# Patient Record
Sex: FEMALE | Race: WHITE | Employment: PART TIME | URBAN - METROPOLITAN AREA
[De-identification: names, ages, dates, MRNs, and addresses within clinical notes are randomized per-mention and may not be internally consistent; named-entity substitution may affect disease eponyms.]

---

## 2023-01-10 ENCOUNTER — ANCILLARY PROCEDURE (OUTPATIENT)
Dept: OBGYN CLINIC | Age: 48
End: 2023-01-10
Payer: MEDICAID

## 2023-01-10 ENCOUNTER — INITIAL PRENATAL (OUTPATIENT)
Dept: OBGYN CLINIC | Age: 48
End: 2023-01-10
Payer: MEDICAID

## 2023-01-10 VITALS
BODY MASS INDEX: 41.25 KG/M2 | SYSTOLIC BLOOD PRESSURE: 112 MMHG | HEART RATE: 88 BPM | DIASTOLIC BLOOD PRESSURE: 75 MMHG | WEIGHT: 204.25 LBS

## 2023-01-10 DIAGNOSIS — Z34.90 FOURTH PREGNANCY: ICD-10-CM

## 2023-01-10 DIAGNOSIS — Z3A.22 22 WEEKS GESTATION OF PREGNANCY: Primary | ICD-10-CM

## 2023-01-10 LAB
GLUCOSE URINE, POC: NEGATIVE
PROTEIN UA: NEGATIVE

## 2023-01-10 PROCEDURE — 99203 OFFICE O/P NEW LOW 30 MIN: CPT | Performed by: OBSTETRICS & GYNECOLOGY

## 2023-01-10 PROCEDURE — 99999 PR OFFICE/OUTPT VISIT,PROCEDURE ONLY: CPT | Performed by: OBSTETRICS & GYNECOLOGY

## 2023-01-10 PROCEDURE — 76811 OB US DETAILED SNGL FETUS: CPT | Performed by: OBSTETRICS & GYNECOLOGY

## 2023-01-10 PROCEDURE — 81002 URINALYSIS NONAUTO W/O SCOPE: CPT | Performed by: OBSTETRICS & GYNECOLOGY

## 2023-01-10 NOTE — PROGRESS NOTES
Pt here for new patient ultrasound  Pt denies any bleeding/cramping  Pt states feeling fetal movement

## 2023-01-10 NOTE — PROGRESS NOTES
[address]  FETAL MEDICINE  [address] [facility] [address] [phone]   [facility] [address] FETAL MEDICINE  [address]  [facility] [address]  Dept: [address] [facility]: [phone]     1/10/2023    RE:  Mariia Forde     : 1975   AGE: 41 y.o. Dear Dr. Jacqueline De La Rosa,    Thank you for allowing me to see Mariia Forde. As I'm sure you will recall, Mariia Forde is a 41 y.o. [id number]'s last menstrual period was 2022.  Estimated Date of Delivery: 5/15/23 at 22w1d seen in our office today for the following:    REASON FOR VISIT: Level II    Patient Active Problem List    Diagnosis Date Noted    22 weeks gestation of pregnancy 01/10/2023    Fourth pregnancy 01/10/2023    BMI 40.0-44.9, adult ([facility] [address] 06/10/2016    Depression 2015    Slow transit constipation 2015    Hashimoto's disease 2012    Vitamin D deficiency 2012    Hypothyroid 2012        PAST HISTORY:  OB History    Para Term  AB Living   4 3 3     3   SAB IAB Ectopic Molar Multiple Live Births             3      # Outcome Date GA Lbr Yusuf/2nd Weight Sex Delivery Anes PTL Lv   4 Current            3 Term 10/02/06 38w0d  7 lb 13 oz (3.544 kg) M Vag-Spont EPI N KWADWO      Birth Comments: cord around neck x2   high risk pregnancy due to thyroid issues and saw Dr. Kathy Figueroa   2 Term 04 40w0d  7 lb 8 oz (3.402 kg) F Vag-Spont EPI  KWADWO   1 Term 99 40w0d  7 lb 5 oz (3.317 kg) M Vag-Spont Local N KWADWO          MEDICAL:  Past Medical History:   Diagnosis Date    Depression     Hypothyroidism complicating pregnancy     Weight gain         SURGICAL:  Past Surgical History:   Procedure Laterality Date    WISDOM TOOTH EXTRACTION         ALLERGIES:    No Known Allergies      MEDICATIONS:    Current Outpatient Medications   Medication Sig Dispense Refill    Prenatal Vit-Fe Fumarate-FA (PRENATAL VITAMIN PO) Take 1 each by mouth daily       No current facility-administered medications for this visit. Social History     Socioeconomic History    Marital status:      Spouse name: None    Number of children: None    Years of education: None    Highest education level: None   Tobacco Use    Smoking status: Former     Types: Cigarettes    Smokeless tobacco: Never   Substance and Sexual Activity    Alcohol use: Not Currently     Comment: one glass wine daily     Drug use: No    Sexual activity: Not Currently          FAMILY MEDICAL HISTORY:   Family History   Problem Relation Age of Onset    Thyroid Disease Mother         and both grandmothers    Elevated Lipids Father     Diabetes Maternal Grandmother     Thyroid Disease Maternal Grandmother     Diabetes Maternal Grandfather     Thyroid Disease Paternal Grandmother           PHYSICAL EXAMINATION:  /75   Pulse 88   Wt 204 lb 4 oz (92.6 kg)   LMP 08/08/2022   Body mass index is 41.25 kg/m². Urine dipstick:  Results for POC orders placed in visit on 01/10/23   POCT urine qual dipstick protein   Result Value Ref Range    Protein, UA Negative Negative   POCT urine qual dipstick glucose   Result Value Ref Range    Glucose, UA POC negative         A/an Level II ultrasound was done in our office today. Please refer to the enclosed copy of the ultrasound report for further information. Discussion:  There is a ibarra fetus in a breech presentation. Fetal cardiac motion and fetal motion was detected in. Be grossly normal.  No obvious anomalies are noted cardiac views were somewhat limited due to the fetal position and maternal habitus. There is been appropriate interval growth. The baby is AGA. The placenta is anterior left lateral.  Amniotic fluid volume is normal.  Cervix appeared to be long and closed. IMPRESSION:  1.  Single intrauterine gestation at 22+ weeks with appropriate interval growth. 2. The fetal anatomy appears normal and the fetal lie is breech. 3. The amniotic fluid volume is normal and the placenta is anterior left lateral.    RECOMMENDATIONS:  Each of the recommendations were discussed with the patient:  1. Return in 4 weeks for completion of anatomy. 2.  Begin antepartum testing at 34 weeks gestation because of BMI and AMA. 3.  Consider growth ultrasounds every 4 weeks. 4.  Delivery at 39 weeks gestation. 5.  Follow-up would be otherwise as clinically indicated. The patient is to continue to follow with you in your office for ongoing obstetric care. PLAN:    As noted above or sooner prn.     Sincerely,        Lesley Torres MD

## 2023-01-10 NOTE — PATIENT INSTRUCTIONS
Please arrive for your scheduled appointment at least 15 minutes early with your actual insurance card+ a photo ID. Also if you need any refills ordered or have questions, it may take up 48 hours to reply. Please allow ample time for your refills. Call me when you use last refill. Thank you for your cooperation. Call your primary obstetrician with bleeding, leaking of fluid, abdominal tenderness, headache, blurry vision, epigastric pain and increased urinary frequency. If you are experiencing an emergency and need immediate help, call 911 or go to go emergency room or labor and delivery. if you are sick, not feeling well or have an infectious process going on please reschedule your appointment by calling [phone]. Also if any family members are not feeling well, please do not bring them to your appointment. We appreciate your cooperation. We are doing this in order to protect our pregnant mothers+ their babies. if you are sick, not feeling well or have an infectious process going on please reschedule your appointment by calling [phone]. Also if any family members are not feeling well, please do not bring them to your appointment. We appreciate your cooperation. We are doing this in order to protect our pregnant mothers+ their babies.

## 2023-02-07 ENCOUNTER — ROUTINE PRENATAL (OUTPATIENT)
Dept: OBGYN CLINIC | Age: 48
End: 2023-02-07
Payer: MEDICAID

## 2023-02-07 ENCOUNTER — ANCILLARY PROCEDURE (OUTPATIENT)
Dept: OBGYN CLINIC | Age: 48
End: 2023-02-07
Payer: MEDICAID

## 2023-02-07 VITALS
HEART RATE: 78 BPM | SYSTOLIC BLOOD PRESSURE: 96 MMHG | DIASTOLIC BLOOD PRESSURE: 58 MMHG | WEIGHT: 205.5 LBS | BODY MASS INDEX: 41.51 KG/M2

## 2023-02-07 DIAGNOSIS — Z3A.26 26 WEEKS GESTATION OF PREGNANCY: Primary | ICD-10-CM

## 2023-02-07 LAB
GLUCOSE URINE, POC: NEGATIVE
PROTEIN UA: NEGATIVE

## 2023-02-07 PROCEDURE — 76816 OB US FOLLOW-UP PER FETUS: CPT | Performed by: OBSTETRICS & GYNECOLOGY

## 2023-02-07 PROCEDURE — 81002 URINALYSIS NONAUTO W/O SCOPE: CPT | Performed by: OBSTETRICS & GYNECOLOGY

## 2023-02-07 PROCEDURE — 99999 PR OFFICE/OUTPT VISIT,PROCEDURE ONLY: CPT | Performed by: OBSTETRICS & GYNECOLOGY

## 2023-02-07 PROCEDURE — 99213 OFFICE O/P EST LOW 20 MIN: CPT | Performed by: OBSTETRICS & GYNECOLOGY

## 2023-02-07 NOTE — PATIENT INSTRUCTIONS
Please arrive for your scheduled appointment at least 15 minutes early with your actual insurance card+ a photo ID. Also if you need any refills ordered or have questions, it may take up 48 hours to reply. Please allow ample time for your refills. Call me when you use last refill. Thank you for your cooperation. Call your primary obstetrician with bleeding, leaking of fluid, abdominal tenderness, headache, blurry vision, epigastric pain and increased urinary frequency. If you are experiencing an emergency and need immediate help, call 911 or go to go emergency room or labor and delivery. if you are sick, not feeling well or have an infectious process going on please reschedule your appointment by calling [phone]. Also if any family members are not feeling well, please do not bring them to your appointment. We appreciate your cooperation. We are doing this in order to protect our pregnant mothers+ their babies. if you are sick, not feeling well or have an infectious process going on please reschedule your appointment by calling [phone]. Also if any family members are not feeling well, please do not bring them to your appointment. We appreciate your cooperation. We are doing this in order to protect our pregnant mothers+ their babies.

## 2023-02-07 NOTE — PROGRESS NOTES
[address] FETAL MEDICINE  [address] \[facility]\"" [address] [phone]   [address] FETAL MEDICINE  [address]  [facility] [address]  Dept: [phone]  Loc: [phone]     2023    RE:  Kaela Orantes     : 1975   AGE: 41 y.o. Dear Dr. Danelle Sher,    Thank you for allowing me to see Kaela Orantes. As I'm sure you will recall, Kaela Orantes is a 41 y.o. [id number]'s last menstrual period was 2022.  Estimated Date of Delivery: 5/15/23 at 26w1d seen in our office today for the following:    REASON FOR VISIT: Growth     Patient Active Problem List    Diagnosis Date Noted    26 weeks gestation of pregnancy 01/10/2023    Fourth pregnancy 01/10/2023    BMI 40.0-44.9, adult ([facility] [address] 06/10/2016    Depression 2015    Slow transit constipation 2015    Hashimoto's disease 2012    Vitamin D deficiency 2012    Hypothyroid 2012        PAST HISTORY:  OB History    Para Term  AB Living   4 3 3     3   SAB IAB Ectopic Molar Multiple Live Births             3      # Outcome Date GA Lbr Yusuf/2nd Weight Sex Delivery Anes PTL Lv   4 Current            3 Term 10/02/06 38w0d  7 lb 13 oz (3.544 kg) M Vag-Spont EPI N KWADWO      Birth Comments: cord around neck x2   high risk pregnancy due to thyroid issues and saw Dr. Rojas Pulse   2 Term 04 40w0d  7 lb 8 oz (3.402 kg) F Vag-Spont EPI  KWADWO   1 Term 99 40w0d  7 lb 5 oz (3.317 kg) M Vag-Spont Local N KWADWO          MEDICAL:  Past Medical History:   Diagnosis Date    Depression     Hypothyroidism complicating pregnancy     Weight gain         SURGICAL:  Past Surgical History:   Procedure Laterality Date    WISDOM TOOTH EXTRACTION         ALLERGIES:    No Known Allergies      MEDICATIONS:    Current Outpatient Medications   Medication Sig Dispense Refill    Prenatal Vit-Fe Fumarate-FA (PRENATAL VITAMIN PO) Take 1 each by mouth daily       No current facility-administered medications for this visit. Social History     Socioeconomic History    Marital status:      Spouse name: None    Number of children: None    Years of education: None    Highest education level: None   Tobacco Use    Smoking status: Former     Types: Cigarettes    Smokeless tobacco: Never   Substance and Sexual Activity    Alcohol use: Not Currently     Comment: one glass wine daily     Drug use: No    Sexual activity: Not Currently          FAMILY MEDICAL HISTORY:   Family History   Problem Relation Age of Onset    Thyroid Disease Mother         and both grandmothers    Elevated Lipids Father     Diabetes Maternal Grandmother     Thyroid Disease Maternal Grandmother     Diabetes Maternal Grandfather     Thyroid Disease Paternal Grandmother           PHYSICAL EXAMINATION:  BP (!) 96/58   Pulse 78   Wt 205 lb 8 oz (93.2 kg)   LMP 08/08/2022   Body mass index is 41.51 kg/m². Urine dipstick:  Results for POC orders placed in visit on 02/07/23   POCT urine qual dipstick protein   Result Value Ref Range    Protein, UA Negative Negative   POCT urine qual dipstick glucose   Result Value Ref Range    Glucose, UA POC negative         A/an Growth  ultrasound was done in our office today. Please refer to the enclosed copy of the ultrasound report for further information. Discussion:  There is a biarra fetus in a wei breech presentation. Fetal cardiac motion and fetal motion is detected and appears to be grossly normal.  Due to fetal position we were unable to complete the anatomy survey. Unable to obtain cardiac views well but have no concerns about the fetal heart. There is been appropriate interval growth. The baby is AGA at the 44th percentile 844 g 1 pound 14 ounces. The placenta is left lateral.  Amniotic fluid volume is normal.    IMPRESSION:  1. Single intrauterine gestation at 26+ weeks with appropriate interval growth. 2.  No obvious fetal anomalies are noted. The fetus is in a wei breech presentation. 3.  The amniotic fluid volume is normal and the placenta is left lateral.    RECOMMENDATIONS:  Each of the recommendations were discussed with the patient:  1. Return in 4 weeks to attempt to complete anatomy survey as well as growth. 2.  Begin antepartum testing at 34 weeks gestation because of the increased BMI. 3.  Delivery at 39 weeks gestation. 4.  Follow-up would be otherwise as clinically indicated. The patient is to continue to follow with you in your office for ongoing obstetric care. PLAN:    As noted above or sooner prn.     Sincerely,        Candy Bradshaw MD

## 2023-03-07 ENCOUNTER — ANCILLARY PROCEDURE (OUTPATIENT)
Dept: OBGYN CLINIC | Age: 48
End: 2023-03-07
Payer: MEDICAID

## 2023-03-07 ENCOUNTER — ROUTINE PRENATAL (OUTPATIENT)
Dept: OBGYN CLINIC | Age: 48
End: 2023-03-07
Payer: MEDICAID

## 2023-03-07 VITALS
DIASTOLIC BLOOD PRESSURE: 85 MMHG | HEART RATE: 86 BPM | BODY MASS INDEX: 42.26 KG/M2 | SYSTOLIC BLOOD PRESSURE: 129 MMHG | WEIGHT: 209.25 LBS

## 2023-03-07 DIAGNOSIS — Z3A.30 30 WEEKS GESTATION OF PREGNANCY: Primary | ICD-10-CM

## 2023-03-07 LAB
GLUCOSE URINE, POC: NEGATIVE
PROTEIN UA: NEGATIVE

## 2023-03-07 PROCEDURE — 99999 PR OFFICE/OUTPT VISIT,PROCEDURE ONLY: CPT | Performed by: OBSTETRICS & GYNECOLOGY

## 2023-03-07 PROCEDURE — 76805 OB US >/= 14 WKS SNGL FETUS: CPT | Performed by: OBSTETRICS & GYNECOLOGY

## 2023-03-07 PROCEDURE — 99213 OFFICE O/P EST LOW 20 MIN: CPT | Performed by: OBSTETRICS & GYNECOLOGY

## 2023-03-07 PROCEDURE — 81002 URINALYSIS NONAUTO W/O SCOPE: CPT | Performed by: OBSTETRICS & GYNECOLOGY

## 2023-03-07 NOTE — PROGRESS NOTES
Pt here for follow up ultrasound  Pt had 3 hour GTT yesterday and results scanned into chart  Pt denies any bleeding/cramping  Pt states good fetal movement

## 2023-03-07 NOTE — PROGRESS NOTES
[address] FETAL MEDICINE  [address]  [facility] [address]  Dept: [address] [facility] [address]: [phone]     3/7/2023    RE:  Vicente Sifuentes     : 1975   AGE: 41 y.o. Dear Dr. Sophie Savage,    Thank you for allowing me to see Vicente Sifuentes. As I'm sure you will recall, Vicente Sifuentes is a 41 y.o. [id number]'s last menstrual period was 2022.  Estimated Date of Delivery: 5/15/23 at 30w1d seen in our office today for the following:    REASON FOR VISIT: Growth     Patient Active Problem List    Diagnosis Date Noted    30 weeks gestation of pregnancy 01/10/2023    Fourth pregnancy 01/10/2023    BMI 40.0-44.9, adult ([facility] [address] 06/10/2016    Depression 2015    Slow transit constipation 2015    Hashimoto's disease 2012    Vitamin D deficiency 2012    Hypothyroid 2012        PAST HISTORY:  OB History    Para Term  AB Living   4 3 3     3   SAB IAB Ectopic Molar Multiple Live Births             3      # Outcome Date GA Lbr Yusuf/2nd Weight Sex Delivery Anes PTL Lv   4 Current            3 Term 10/02/06 38w0d  7 lb 13 oz (3.544 kg) M Vag-Spont EPI N KWADWO      Birth Comments: cord around neck x2   high risk pregnancy due to thyroid issues and saw Dr. Kyara Mcelroy   2 Term 04 40w0d  7 lb 8 oz (3.402 kg) F Vag-Spont EPI  KWADWO   1 Term 99 40w0d  7 lb 5 oz (3.317 kg) M Vag-Spont Local N KWADWO          MEDICAL:  Past Medical History:   Diagnosis Date    Depression     Hypothyroidism complicating pregnancy     Weight gain         SURGICAL:  Past Surgical History:   Procedure Laterality Date    WISDOM TOOTH EXTRACTION         ALLERGIES:    No Known Allergies      MEDICATIONS:    Current Outpatient Medications   Medication Sig Dispense Refill    Prenatal Vit-Fe Fumarate-FA (PRENATAL VITAMIN PO) Take 1 each by mouth daily       No current facility-administered medications for this visit. Social History     Socioeconomic History    Marital status:      Spouse name: None    Number of children: None    Years of education: None    Highest education level: None   Tobacco Use    Smoking status: Former     Types: Cigarettes    Smokeless tobacco: Never   Substance and Sexual Activity    Alcohol use: Not Currently     Comment: one glass wine daily     Drug use: No    Sexual activity: Not Currently          FAMILY MEDICAL HISTORY:   Family History   Problem Relation Age of Onset    Thyroid Disease Mother         and both grandmothers    Elevated Lipids Father     Diabetes Maternal Grandmother     Thyroid Disease Maternal Grandmother     Diabetes Maternal Grandfather     Thyroid Disease Paternal Grandmother           PHYSICAL EXAMINATION:  /85   Pulse 86   Wt 209 lb 4 oz (94.9 kg)   LMP 08/08/2022   Body mass index is 42.26 kg/m². Urine dipstick:  Results for POC orders placed in visit on 03/07/23   POCT urine qual dipstick protein   Result Value Ref Range    Protein, UA Negative Negative   POCT urine qual dipstick glucose   Result Value Ref Range    Glucose, UA POC negative         A/an Growth  ultrasound was done in our office today. Please refer to the enclosed copy of the ultrasound report for further information. Discussion:  There is a ibarra fetus in a vertex presentation. Fetal cardiac motion and fetal motion was detected and appears to be grossly normal.  There is been appropriate interval growth. The baby is AGA at the 42nd percentile. Estimated fetal weight 1420 g 3 pounds 2 ounces. The placenta is anterior. Amniotic fluid volume is normal.    IMPRESSION:  1. Single intrauterine gestation at 30+ weeks with appropriate interval growth. 2.  No obvious fetal anomalies are noted. The fetus is in a vertex presentation. 3.  The amniotic fluid volume is normal and the placenta is anterior.     RECOMMENDATIONS:  Each of the recommendations were discussed with the patient:  1. Continue growth ultrasounds every 4 weeks. 2.  Begin weekly biophysical profiles at 34 weeks because of the increased BMI. 3.  Delivery at 39 weeks gestation. 4.  Follow-up would be otherwise as clinically indicated. The patient is to continue to follow with you in your office for ongoing obstetric care. PLAN:    As noted above or sooner prn.     Sincerely,        Jonatan Castillo MD

## 2023-03-07 NOTE — PATIENT INSTRUCTIONS
Please arrive for your scheduled appointment at least 15 minutes early with your actual insurance card+ a photo ID. Also if you need any refills ordered or have questions, it may take up 48 hours to reply. Please allow ample time for your refills. Call me when you use last refill. Thank you for your cooperation. Do kick counts after dinner. Call your primary obstetrician if less than 10 kicks in 2 hours after dinner. Call your primary obstetrician with bleeding, leaking of fluid, abdominal tenderness, headache, blurry vision, epigastric pain and increased urinary frequency. if you are sick, not feeling well or have an infectious process going on please reschedule your appointment by calling [phone]. Also if any family members are not feeling well, please do not bring them to your appointment. We appreciate your cooperation. We are doing this in order to protect our pregnant mothers+ their babies.

## 2023-04-04 ENCOUNTER — ANCILLARY PROCEDURE (OUTPATIENT)
Dept: OBGYN CLINIC | Age: 48
End: 2023-04-04
Payer: MEDICAID

## 2023-04-04 ENCOUNTER — ROUTINE PRENATAL (OUTPATIENT)
Dept: OBGYN CLINIC | Age: 48
End: 2023-04-04
Payer: MEDICAID

## 2023-04-04 VITALS
HEART RATE: 87 BPM | SYSTOLIC BLOOD PRESSURE: 99 MMHG | BODY MASS INDEX: 42.64 KG/M2 | WEIGHT: 211.13 LBS | DIASTOLIC BLOOD PRESSURE: 68 MMHG

## 2023-04-04 DIAGNOSIS — Z3A.34 34 WEEKS GESTATION OF PREGNANCY: Primary | ICD-10-CM

## 2023-04-04 DIAGNOSIS — O09.523 MULTIGRAVIDA OF ADVANCED MATERNAL AGE IN THIRD TRIMESTER: ICD-10-CM

## 2023-04-04 LAB
GLUCOSE URINE, POC: NEGATIVE
PROTEIN UA: NEGATIVE

## 2023-04-04 PROCEDURE — 76819 FETAL BIOPHYS PROFIL W/O NST: CPT | Performed by: OBSTETRICS & GYNECOLOGY

## 2023-04-04 PROCEDURE — 81002 URINALYSIS NONAUTO W/O SCOPE: CPT | Performed by: OBSTETRICS & GYNECOLOGY

## 2023-04-04 PROCEDURE — H1000 PRENATAL CARE ATRISK ASSESSM: HCPCS | Performed by: OBSTETRICS & GYNECOLOGY

## 2023-04-04 PROCEDURE — 99213 OFFICE O/P EST LOW 20 MIN: CPT | Performed by: OBSTETRICS & GYNECOLOGY

## 2023-04-04 PROCEDURE — 76816 OB US FOLLOW-UP PER FETUS: CPT | Performed by: OBSTETRICS & GYNECOLOGY

## 2023-04-04 PROCEDURE — 99999 PR OFFICE/OUTPT VISIT,PROCEDURE ONLY: CPT | Performed by: OBSTETRICS & GYNECOLOGY

## 2023-04-04 NOTE — PATIENT INSTRUCTIONS
Please arrive for your scheduled appointment at least 15 minutes early with your actual insurance card+ a photo ID. Also if you need any refills ordered or have questions, it may take up 48 hours to reply. Please allow ample time for your refills. Call me when you use last refill. Thank you for your cooperation. Call your primary obstetrician with bleeding, leaking of fluid, abdominal tenderness, headache, blurry vision, epigastric pain and increased urinary frequency. If you are experiencing an emergency and need immediate help, call 911 or go to go emergency room or labor and delivery. Do kick counts after dinner. Call your primary obstetrician if less than 10 kicks in 2 hours after dinner. Call your primary obstetrician with bleeding, leaking of fluid, abdominal tenderness, headache, blurry vision, epigastric pain and increased urinary frequency. if you are sick, not feeling well or have an infectious process going on please reschedule your appointment by calling [phone]. Also if any family members are not feeling well, please do not bring them to your appointment. We appreciate your cooperation. We are doing this in order to protect our pregnant mothers+ their babies.

## 2023-04-04 NOTE — PROGRESS NOTES
[address] FETAL MEDICINE  [address]  [facility] [address]  Dept: [address] [facility] [address]: [phone]     2023    RE:  Stacie Setting     : 1975   AGE: 41 y.o. Dear Dr. Keith Nielson,    Thank you for allowing me to see Stacie Setting. As I'm sure you will recall, Stacie Setting is a 41 y.o. [id number]'s last menstrual period was 2022.  Estimated Date of Delivery: 5/15/23 at 34w1d seen in our office today for the following:    REASON FOR VISIT: Growth     Patient Active Problem List    Diagnosis Date Noted    34 weeks gestation of pregnancy 01/10/2023    Fourth pregnancy 01/10/2023    Multigravida of advanced maternal age in third trimester 2023    BMI 40.0-44.9, adult ([address] 06/10/2016    Depression 2015    Slow transit constipation 2015    Hashimoto's disease 2012    Vitamin D deficiency 2012    Hypothyroid 2012        PAST HISTORY:  OB History    Para Term  AB Living   4 3 3     3   SAB IAB Ectopic Molar Multiple Live Births             3      # Outcome Date GA Lbr Yusuf/2nd Weight Sex Delivery Anes PTL Lv   4 Current            3 Term 10/02/06 38w0d  7 lb 13 oz (3.544 kg) M Vag-Spont EPI N KWADWO      Birth Comments: cord around neck x2   high risk pregnancy due to thyroid issues and saw Dr. Praveen Huddleston   2 Term 04 40w0d  7 lb 8 oz (3.402 kg) F Vag-Spont EPI  KWADWO   1 Term 99 40w0d  7 lb 5 oz (3.317 kg) M Vag-Spont Local N KWADWO          MEDICAL:  Past Medical History:   Diagnosis Date    Depression     Hypothyroidism complicating pregnancy     Weight gain         SURGICAL:  Past Surgical History:   Procedure Laterality Date    WISDOM TOOTH EXTRACTION         ALLERGIES:    No Known Allergies      MEDICATIONS:    Current Outpatient Medications   Medication Sig Dispense Refill    Prenatal Vit-Fe Fumarate-FA (PRENATAL VITAMIN PO) Take 1 each by

## 2023-04-04 NOTE — PROGRESS NOTES
Pt here for 4 week follow up visit  Pt denies any bleeding/cramping/lof  Pt states good fetal movement

## 2023-04-14 ENCOUNTER — ANCILLARY PROCEDURE (OUTPATIENT)
Dept: OBGYN CLINIC | Age: 48
End: 2023-04-14
Payer: MEDICAID

## 2023-04-14 PROBLEM — Z3A.35 35 WEEKS GESTATION OF PREGNANCY: Status: ACTIVE | Noted: 2023-01-10

## 2023-04-14 PROCEDURE — 76818 FETAL BIOPHYS PROFILE W/NST: CPT | Performed by: OBSTETRICS & GYNECOLOGY

## 2023-04-21 ENCOUNTER — ROUTINE PRENATAL (OUTPATIENT)
Dept: OBGYN CLINIC | Age: 48
End: 2023-04-21
Payer: MEDICAID

## 2023-04-21 ENCOUNTER — ANCILLARY PROCEDURE (OUTPATIENT)
Dept: OBGYN CLINIC | Age: 48
End: 2023-04-21
Payer: MEDICAID

## 2023-04-21 VITALS
SYSTOLIC BLOOD PRESSURE: 108 MMHG | WEIGHT: 214 LBS | DIASTOLIC BLOOD PRESSURE: 70 MMHG | HEART RATE: 84 BPM | BODY MASS INDEX: 43.22 KG/M2

## 2023-04-21 DIAGNOSIS — O09.523 MULTIGRAVIDA OF ADVANCED MATERNAL AGE IN THIRD TRIMESTER: Primary | ICD-10-CM

## 2023-04-21 DIAGNOSIS — Z34.90 FOURTH PREGNANCY: ICD-10-CM

## 2023-04-21 DIAGNOSIS — E06.3 HASHIMOTO'S DISEASE: ICD-10-CM

## 2023-04-21 DIAGNOSIS — Z3A.36 36 WEEKS GESTATION OF PREGNANCY: ICD-10-CM

## 2023-04-21 LAB
GLUCOSE URINE, POC: NORMAL
PROTEIN UA: NEGATIVE

## 2023-04-21 PROCEDURE — 99213 OFFICE O/P EST LOW 20 MIN: CPT | Performed by: OBSTETRICS & GYNECOLOGY

## 2023-04-21 PROCEDURE — 76818 FETAL BIOPHYS PROFILE W/NST: CPT | Performed by: OBSTETRICS & GYNECOLOGY

## 2023-04-21 PROCEDURE — 81002 URINALYSIS NONAUTO W/O SCOPE: CPT | Performed by: OBSTETRICS & GYNECOLOGY

## 2023-04-21 NOTE — PROGRESS NOTES
Patient is here today for bpp/nst. Denies any vaginal bleeding, or leakage of fluids. Lower pelvic pressure, worse when getting up from sitting for a long time. Patient reports good fetal movement.

## 2023-04-21 NOTE — PROGRESS NOTES
[address] FETAL MEDICINE  [address]  [facility] [address]  Dept: [phone]  Loc: [phone]     2023    RE:  Donna Rivera     : 1975   AGE: 41 y.o. Dear Dr. Justin Mosqueda,    Thank you for allowing me to see Donna Rivera. As I'm sure you will recall, Donna Rivera is a 41 y.o. [id number]'s last menstrual period was 2022.  Estimated Date of Delivery: 5/15/23 at 36w4d seen in our office today for the following:    REASON FOR VISIT: BPP and NST    Patient Active Problem List    Diagnosis Date Noted    36 weeks gestation of pregnancy 01/10/2023    Fourth pregnancy 01/10/2023    Multigravida of advanced maternal age in third trimester 2023    BMI 40.0-44.9, adult ([facility] [address] 06/10/2016    Depression 2015    Slow transit constipation 2015    Hashimoto's disease 2012    Vitamin D deficiency 2012    Hypothyroid 2012        PAST HISTORY:  OB History    Para Term  AB Living   4 3 3     3   SAB IAB Ectopic Molar Multiple Live Births             3      # Outcome Date GA Lbr Yusuf/2nd Weight Sex Delivery Anes PTL Lv   4 Current            3 Term 10/02/06 38w0d  7 lb 13 oz (3.544 kg) M Vag-Spont EPI N KWADWO      Birth Comments: cord around neck x2   high risk pregnancy due to thyroid issues and saw Dr. Suzan Ortiz   2 Term 04 40w0d  7 lb 8 oz (3.402 kg) F Vag-Spont EPI  KWADWO   1 Term 99 40w0d  7 lb 5 oz (3.317 kg) M Vag-Spont Local N KWADWO          MEDICAL:  Past Medical History:   Diagnosis Date    Depression     Hypothyroidism complicating pregnancy     Weight gain         SURGICAL:  Past Surgical History:   Procedure Laterality Date    WISDOM TOOTH EXTRACTION         ALLERGIES:    No Known Allergies      MEDICATIONS:    Current Outpatient Medications   Medication Sig Dispense Refill    famotidine (PEPCID) 20

## 2023-04-28 ENCOUNTER — ROUTINE PRENATAL (OUTPATIENT)
Dept: OBGYN CLINIC | Age: 48
End: 2023-04-28
Payer: MEDICAID

## 2023-04-28 ENCOUNTER — ANCILLARY PROCEDURE (OUTPATIENT)
Dept: OBGYN CLINIC | Age: 48
End: 2023-04-28
Payer: MEDICAID

## 2023-04-28 VITALS
WEIGHT: 219 LBS | HEART RATE: 90 BPM | DIASTOLIC BLOOD PRESSURE: 77 MMHG | BODY MASS INDEX: 44.23 KG/M2 | SYSTOLIC BLOOD PRESSURE: 119 MMHG

## 2023-04-28 DIAGNOSIS — O09.523 MULTIGRAVIDA OF ADVANCED MATERNAL AGE IN THIRD TRIMESTER: Primary | ICD-10-CM

## 2023-04-28 DIAGNOSIS — Z3A.37 37 WEEKS GESTATION OF PREGNANCY: ICD-10-CM

## 2023-04-28 PROBLEM — Z3A.36 36 WEEKS GESTATION OF PREGNANCY: Status: RESOLVED | Noted: 2023-01-10 | Resolved: 2023-04-28

## 2023-04-28 LAB
GLUCOSE URINE, POC: NEGATIVE
PROTEIN UA: POSITIVE

## 2023-04-28 PROCEDURE — 81002 URINALYSIS NONAUTO W/O SCOPE: CPT | Performed by: OBSTETRICS & GYNECOLOGY

## 2023-04-28 PROCEDURE — 99213 OFFICE O/P EST LOW 20 MIN: CPT | Performed by: OBSTETRICS & GYNECOLOGY

## 2023-04-28 PROCEDURE — 76818 FETAL BIOPHYS PROFILE W/NST: CPT | Performed by: OBSTETRICS & GYNECOLOGY

## 2023-04-28 PROCEDURE — 76816 OB US FOLLOW-UP PER FETUS: CPT | Performed by: OBSTETRICS & GYNECOLOGY

## 2023-04-28 NOTE — PROGRESS NOTES
Pt here for BPP/NST  Pt going to [facility] for ultrasound on left foot due to swelling and cramping  Pt states good fetal movement   Pt denies any bleeding/cramping/lof

## 2023-04-28 NOTE — PATIENT INSTRUCTIONS

## 2023-04-28 NOTE — PROGRESS NOTES
[address] FETAL MEDICINE  [address] [facility] [address]  Dept: [address] [facility] [address]: [phone]     2023    RE:  José Summers     : 1975   AGE: 41 y.o. Dear Dr. Dwight Sanders,    Thank you for allowing me to see José Summers. As I'm sure you will recall, José Summers is a 41 y.o. [id number]'s last menstrual period was 2022.  Estimated Date of Delivery: 5/15/23 at 37w4d seen in our office today for the following:    REASON FOR VISIT: Growth, BPP, NST    Patient Active Problem List    Diagnosis Date Noted    Fourth pregnancy 01/10/2023    37 weeks gestation of pregnancy 2023    Multigravida of advanced maternal age in third trimester 2023    BMI 40.0-44.9, adult ([facility] [address] 06/10/2016    Depression 2015    Slow transit constipation 2015    Hashimoto's disease 2012    Vitamin D deficiency 2012    Hypothyroid 2012        PAST HISTORY:  OB History    Para Term  AB Living   4 3 3     3   SAB IAB Ectopic Molar Multiple Live Births             3      # Outcome Date GA Lbr Yusuf/2nd Weight Sex Delivery Anes PTL Lv   4 Current            3 Term 10/02/06 38w0d  7 lb 13 oz (3.544 kg) M Vag-Spont EPI N KWADWO      Birth Comments: cord around neck x2   high risk pregnancy due to thyroid issues and saw Dr. Edi Staples   2 Term 04 40w0d  7 lb 8 oz (3.402 kg) F Vag-Spont EPI  KWADWO   1 Term 99 40w0d  7 lb 5 oz (3.317 kg) M Vag-Spont Local N KWADWO          MEDICAL:  Past Medical History:   Diagnosis Date    Depression     Hypothyroidism complicating pregnancy 9105    Weight gain         SURGICAL:  Past Surgical History:   Procedure Laterality Date    WISDOM TOOTH EXTRACTION         ALLERGIES:    No Known Allergies      MEDICATIONS:    Current Outpatient Medications   Medication Sig Dispense Refill    famotidine (PEPCID) 20 MG tablet Take 1 tablet by mouth 2

## 2023-05-02 ENCOUNTER — HOSPITAL ENCOUNTER (INPATIENT)
Age: 48
LOS: 2 days | Discharge: HOME | End: 2023-05-05
Attending: OBSTETRICS & GYNECOLOGY | Admitting: OBSTETRICS & GYNECOLOGY
Payer: MEDICAID

## 2023-05-02 PROBLEM — Z3A.38 38 WEEKS GESTATION OF PREGNANCY: Status: ACTIVE | Noted: 2023-05-02

## 2023-05-02 LAB
AMNISURE, POC: POSITIVE
Lab: NORMAL
NEGATIVE QC PASS/FAIL: NORMAL
POSITIVE QC PASS/FAIL: NORMAL

## 2023-05-02 PROCEDURE — 99232 SBSQ HOSP IP/OBS MODERATE 35: CPT | Performed by: MIDWIFE

## 2023-05-02 RX ORDER — MEPERIDINE HYDROCHLORIDE 25 MG/ML
25 INJECTION INTRAMUSCULAR; INTRAVENOUS; SUBCUTANEOUS EVERY 4 HOURS PRN
Status: DISCONTINUED | OUTPATIENT
Start: 2023-05-02 | End: 2023-05-03

## 2023-05-03 ENCOUNTER — ANESTHESIA EVENT (OUTPATIENT)
Dept: LABOR AND DELIVERY | Age: 48
End: 2023-05-03
Payer: MEDICAID

## 2023-05-03 ENCOUNTER — ANESTHESIA (OUTPATIENT)
Dept: LABOR AND DELIVERY | Age: 48
End: 2023-05-03
Payer: MEDICAID

## 2023-05-03 LAB
ABO + RH BLD: NORMAL
AMPHET UR QL SCN: NOT DETECTED
BARBITURATES UR QL SCN: NOT DETECTED
BENZODIAZ UR QL SCN: NOT DETECTED
BLD GP AB SCN SERPL QL: NORMAL
CANNABINOIDS UR QL SCN: NOT DETECTED
COCAINE UR QL SCN: NOT DETECTED
DRUG SCREEN COMMENT UR-IMP: NORMAL
ERYTHROCYTE [DISTWIDTH] IN BLOOD BY AUTOMATED COUNT: 13.3 FL (ref 11.5–15)
FENTANYL SCREEN, URINE: NOT DETECTED
HCT VFR BLD AUTO: 31.2 % (ref 34–48)
HGB BLD-MCNC: 10.2 G/DL (ref 11.5–15.5)
MCH RBC QN AUTO: 32.4 PG (ref 26–35)
MCHC RBC AUTO-ENTMCNC: 32.7 % (ref 32–34.5)
MCV RBC AUTO: 99 FL (ref 80–99.9)
METHADONE UR QL SCN: NOT DETECTED
OPIATES UR QL SCN: NOT DETECTED
OXYCODONE URINE: NOT DETECTED
PCP UR QL SCN: NOT DETECTED
PLATELET # BLD AUTO: 208 E9/L (ref 130–450)
PMV BLD AUTO: 10.5 FL (ref 7–12)
RBC # BLD AUTO: 3.15 E12/L (ref 3.5–5.5)
WBC # BLD: 8.1 E9/L (ref 4.5–11.5)

## 2023-05-03 PROCEDURE — 36415 COLL VENOUS BLD VENIPUNCTURE: CPT

## 2023-05-03 PROCEDURE — 3700000025 EPIDURAL BLOCK: Performed by: ANESTHESIOLOGY

## 2023-05-03 PROCEDURE — 80307 DRUG TEST PRSMV CHEM ANLYZR: CPT

## 2023-05-03 PROCEDURE — 6370000000 HC RX 637 (ALT 250 FOR IP): Performed by: OBSTETRICS & GYNECOLOGY

## 2023-05-03 PROCEDURE — 2500000003 HC RX 250 WO HCPCS: Performed by: ANESTHESIOLOGY

## 2023-05-03 PROCEDURE — 2580000003 HC RX 258: Performed by: OBSTETRICS & GYNECOLOGY

## 2023-05-03 PROCEDURE — 85027 COMPLETE CBC AUTOMATED: CPT

## 2023-05-03 PROCEDURE — 6360000002 HC RX W HCPCS: Performed by: OBSTETRICS & GYNECOLOGY

## 2023-05-03 PROCEDURE — 1220000000 HC SEMI PRIVATE OB R&B

## 2023-05-03 PROCEDURE — 86900 BLOOD TYPING SEROLOGIC ABO: CPT

## 2023-05-03 PROCEDURE — 86901 BLOOD TYPING SEROLOGIC RH(D): CPT

## 2023-05-03 PROCEDURE — 86850 RBC ANTIBODY SCREEN: CPT

## 2023-05-03 PROCEDURE — 7200000001 HC VAGINAL DELIVERY

## 2023-05-03 PROCEDURE — 51701 INSERT BLADDER CATHETER: CPT

## 2023-05-03 RX ORDER — SODIUM CHLORIDE, SODIUM LACTATE, POTASSIUM CHLORIDE, CALCIUM CHLORIDE 600; 310; 30; 20 MG/100ML; MG/100ML; MG/100ML; MG/100ML
INJECTION, SOLUTION INTRAVENOUS CONTINUOUS
Status: DISCONTINUED | OUTPATIENT
Start: 2023-05-03 | End: 2023-05-05 | Stop reason: HOSPADM

## 2023-05-03 RX ORDER — MISOPROSTOL 200 UG/1
800 TABLET ORAL PRN
Status: DISCONTINUED | OUTPATIENT
Start: 2023-05-03 | End: 2023-05-03

## 2023-05-03 RX ORDER — SODIUM CHLORIDE 0.9 % (FLUSH) 0.9 %
5-40 SYRINGE (ML) INJECTION PRN
Status: DISCONTINUED | OUTPATIENT
Start: 2023-05-03 | End: 2023-05-05 | Stop reason: HOSPADM

## 2023-05-03 RX ORDER — MODIFIED LANOLIN
OINTMENT (GRAM) TOPICAL PRN
Status: DISCONTINUED | OUTPATIENT
Start: 2023-05-03 | End: 2023-05-05 | Stop reason: HOSPADM

## 2023-05-03 RX ORDER — SIMETHICONE 80 MG
80 TABLET,CHEWABLE ORAL EVERY 6 HOURS PRN
Status: DISCONTINUED | OUTPATIENT
Start: 2023-05-03 | End: 2023-05-05 | Stop reason: HOSPADM

## 2023-05-03 RX ORDER — ONDANSETRON 4 MG/1
4 TABLET, ORALLY DISINTEGRATING ORAL EVERY 6 HOURS PRN
Status: DISCONTINUED | OUTPATIENT
Start: 2023-05-03 | End: 2023-05-05 | Stop reason: HOSPADM

## 2023-05-03 RX ORDER — ACETAMINOPHEN 650 MG
TABLET, EXTENDED RELEASE ORAL
Status: DISCONTINUED
Start: 2023-05-03 | End: 2023-05-03

## 2023-05-03 RX ORDER — PANTOPRAZOLE SODIUM 40 MG/1
40 TABLET, DELAYED RELEASE ORAL DAILY PRN
Status: DISCONTINUED | OUTPATIENT
Start: 2023-05-03 | End: 2023-05-05 | Stop reason: HOSPADM

## 2023-05-03 RX ORDER — BISACODYL 10 MG
10 SUPPOSITORY, RECTAL RECTAL DAILY PRN
Status: DISCONTINUED | OUTPATIENT
Start: 2023-05-03 | End: 2023-05-05 | Stop reason: HOSPADM

## 2023-05-03 RX ORDER — ONDANSETRON 2 MG/ML
4 INJECTION INTRAMUSCULAR; INTRAVENOUS EVERY 6 HOURS PRN
Status: DISCONTINUED | OUTPATIENT
Start: 2023-05-03 | End: 2023-05-03

## 2023-05-03 RX ORDER — DOCUSATE SODIUM 100 MG/1
100 CAPSULE, LIQUID FILLED ORAL 2 TIMES DAILY
Status: DISCONTINUED | OUTPATIENT
Start: 2023-05-03 | End: 2023-05-05 | Stop reason: HOSPADM

## 2023-05-03 RX ORDER — LIDOCAINE HYDROCHLORIDE 10 MG/ML
INJECTION, SOLUTION INFILTRATION; PERINEURAL
Status: DISCONTINUED
Start: 2023-05-03 | End: 2023-05-03

## 2023-05-03 RX ORDER — SODIUM CHLORIDE, SODIUM LACTATE, POTASSIUM CHLORIDE, CALCIUM CHLORIDE 600; 310; 30; 20 MG/100ML; MG/100ML; MG/100ML; MG/100ML
INJECTION, SOLUTION INTRAVENOUS CONTINUOUS
Status: DISCONTINUED | OUTPATIENT
Start: 2023-05-03 | End: 2023-05-03

## 2023-05-03 RX ORDER — DOCUSATE SODIUM 100 MG/1
100 CAPSULE, LIQUID FILLED ORAL 2 TIMES DAILY
Status: DISCONTINUED | OUTPATIENT
Start: 2023-05-03 | End: 2023-05-03

## 2023-05-03 RX ORDER — SODIUM CHLORIDE 0.9 % (FLUSH) 0.9 %
5-40 SYRINGE (ML) INJECTION EVERY 12 HOURS SCHEDULED
Status: DISCONTINUED | OUTPATIENT
Start: 2023-05-03 | End: 2023-05-03

## 2023-05-03 RX ORDER — SODIUM CHLORIDE, SODIUM LACTATE, POTASSIUM CHLORIDE, AND CALCIUM CHLORIDE .6; .31; .03; .02 G/100ML; G/100ML; G/100ML; G/100ML
500 INJECTION, SOLUTION INTRAVENOUS PRN
Status: DISCONTINUED | OUTPATIENT
Start: 2023-05-03 | End: 2023-05-03

## 2023-05-03 RX ORDER — IBUPROFEN 600 MG/1
600 TABLET ORAL EVERY 8 HOURS SCHEDULED
Status: DISCONTINUED | OUTPATIENT
Start: 2023-05-03 | End: 2023-05-05 | Stop reason: HOSPADM

## 2023-05-03 RX ORDER — NALOXONE HYDROCHLORIDE 0.4 MG/ML
INJECTION, SOLUTION INTRAMUSCULAR; INTRAVENOUS; SUBCUTANEOUS PRN
Status: DISCONTINUED | OUTPATIENT
Start: 2023-05-03 | End: 2023-05-03

## 2023-05-03 RX ORDER — SODIUM CHLORIDE, SODIUM LACTATE, POTASSIUM CHLORIDE, AND CALCIUM CHLORIDE .6; .31; .03; .02 G/100ML; G/100ML; G/100ML; G/100ML
1000 INJECTION, SOLUTION INTRAVENOUS PRN
Status: DISCONTINUED | OUTPATIENT
Start: 2023-05-03 | End: 2023-05-03

## 2023-05-03 RX ORDER — SODIUM CHLORIDE 0.9 % (FLUSH) 0.9 %
5-40 SYRINGE (ML) INJECTION PRN
Status: DISCONTINUED | OUTPATIENT
Start: 2023-05-03 | End: 2023-05-03

## 2023-05-03 RX ORDER — SODIUM CHLORIDE 9 MG/ML
INJECTION, SOLUTION INTRAVENOUS PRN
Status: DISCONTINUED | OUTPATIENT
Start: 2023-05-03 | End: 2023-05-05 | Stop reason: HOSPADM

## 2023-05-03 RX ORDER — METHYLERGONOVINE MALEATE 0.2 MG/ML
200 INJECTION INTRAVENOUS PRN
Status: DISCONTINUED | OUTPATIENT
Start: 2023-05-03 | End: 2023-05-03

## 2023-05-03 RX ORDER — FERROUS SULFATE 325(65) MG
325 TABLET ORAL
Status: DISCONTINUED | OUTPATIENT
Start: 2023-05-03 | End: 2023-05-05 | Stop reason: HOSPADM

## 2023-05-03 RX ORDER — CARBOPROST TROMETHAMINE 250 UG/ML
250 INJECTION, SOLUTION INTRAMUSCULAR PRN
Status: DISCONTINUED | OUTPATIENT
Start: 2023-05-03 | End: 2023-05-03

## 2023-05-03 RX ORDER — SODIUM CHLORIDE 0.9 % (FLUSH) 0.9 %
5-40 SYRINGE (ML) INJECTION EVERY 12 HOURS SCHEDULED
Status: DISCONTINUED | OUTPATIENT
Start: 2023-05-03 | End: 2023-05-05 | Stop reason: HOSPADM

## 2023-05-03 RX ORDER — SODIUM CHLORIDE 9 MG/ML
25 INJECTION, SOLUTION INTRAVENOUS PRN
Status: DISCONTINUED | OUTPATIENT
Start: 2023-05-03 | End: 2023-05-03

## 2023-05-03 RX ORDER — ACETAMINOPHEN 500 MG
1000 TABLET ORAL EVERY 8 HOURS SCHEDULED
Status: DISCONTINUED | OUTPATIENT
Start: 2023-05-03 | End: 2023-05-05 | Stop reason: HOSPADM

## 2023-05-03 RX ADMIN — ACETAMINOPHEN 1000 MG: 500 TABLET ORAL at 16:19

## 2023-05-03 RX ADMIN — Medication 15 ML/HR: at 04:58

## 2023-05-03 RX ADMIN — SODIUM CHLORIDE, PRESERVATIVE FREE 10 ML: 5 INJECTION INTRAVENOUS at 11:15

## 2023-05-03 RX ADMIN — Medication 5 ML: at 04:53

## 2023-05-03 RX ADMIN — SODIUM CHLORIDE, POTASSIUM CHLORIDE, SODIUM LACTATE AND CALCIUM CHLORIDE: 600; 310; 30; 20 INJECTION, SOLUTION INTRAVENOUS at 00:08

## 2023-05-03 RX ADMIN — Medication 5 ML: at 04:55

## 2023-05-03 RX ADMIN — DOCUSATE SODIUM 100 MG: 100 CAPSULE, LIQUID FILLED ORAL at 11:15

## 2023-05-03 RX ADMIN — Medication: at 11:15

## 2023-05-03 RX ADMIN — IBUPROFEN 600 MG: 600 TABLET, FILM COATED ORAL at 11:15

## 2023-05-03 RX ADMIN — DOCUSATE SODIUM 100 MG: 100 CAPSULE, LIQUID FILLED ORAL at 19:40

## 2023-05-03 RX ADMIN — Medication 1 MILLI-UNITS/MIN: at 02:23

## 2023-05-03 ASSESSMENT — PAIN - FUNCTIONAL ASSESSMENT
PAIN_FUNCTIONAL_ASSESSMENT: ACTIVITIES ARE NOT PREVENTED

## 2023-05-03 ASSESSMENT — PAIN DESCRIPTION - DESCRIPTORS
DESCRIPTORS: ACHING;CRAMPING
DESCRIPTORS: CRAMPING
DESCRIPTORS: CRAMPING

## 2023-05-03 ASSESSMENT — PAIN DESCRIPTION - LOCATION
LOCATION: ABDOMEN

## 2023-05-03 ASSESSMENT — PAIN SCALES - GENERAL
PAINLEVEL_OUTOF10: 3
PAINLEVEL_OUTOF10: 1
PAINLEVEL_OUTOF10: 5

## 2023-05-03 ASSESSMENT — PAIN DESCRIPTION - ORIENTATION
ORIENTATION: LOWER
ORIENTATION: LOWER

## 2023-05-03 NOTE — PROGRESS NOTES
RN called Dr. Faye Hare to update that pt is complete and comfortable. FHT showed repetitive lates with ctx and RN turned off pitocin. Order to restart pitocin at [phone] and push with pt when ready.

## 2023-05-03 NOTE — ANESTHESIA PRE PROCEDURE
Counseling given: Not Answered      Vital Signs (Current):   Vitals:    05/02/23 2319 05/02/23 2321 05/03/23 0300   BP: (!) 115/58  (!) 105/58   Pulse: 93  79   Resp: 16  16   Temp: 36.5 °C (97.7 °F)  36.5 °C (97.7 °F)   TempSrc: Oral  Oral   SpO2: 100%     Weight:  215 lb (97.5 kg)    Height:  4' 11\" (1.499 m)                                               BP Readings from Last 3 Encounters:   05/03/23 (!) 105/58   04/28/23 119/77   04/21/23 108/70       NPO Status:                                                                                 BMI:   Wt Readings from Last 3 Encounters:   05/02/23 215 lb (97.5 kg)   04/28/23 219 lb (99.3 kg)   04/21/23 214 lb (97.1 kg)     Body mass index is 43.42 kg/m². CBC:   Lab Results   Component Value Date/Time    WBC 8.1 05/03/2023 12:10 AM    RBC 3.15 05/03/2023 12:10 AM    RBC 4.12 06/04/2018 03:23 PM    HGB 10.2 05/03/2023 12:10 AM    HCT 31.2 05/03/2023 12:10 AM    MCV 99.0 05/03/2023 12:10 AM    RDW 13.3 05/03/2023 12:10 AM     05/03/2023 12:10 AM       CMP:   Lab Results   Component Value Date/Time     06/04/2018 03:23 PM    K 4.3 06/04/2018 03:23 PM     06/04/2018 03:23 PM    CO2 23 06/04/2018 03:23 PM    BUN 10 06/04/2018 03:23 PM    CREATININE 0.71 06/04/2018 03:23 PM    LABGLOM >60 09/04/2012 06:15 PM    GLUCOSE 86 06/04/2018 03:23 PM    PROT 7.4 06/04/2018 03:23 PM    CALCIUM 9.3 06/04/2018 03:23 PM    BILITOT 0.7 06/04/2018 03:23 PM    ALKPHOS 82 06/04/2018 03:23 PM    AST 18 06/04/2018 03:23 PM    ALT 32 06/04/2018 03:23 PM       POC Tests: No results for input(s): POCGLU, POCNA, POCK, POCCL, POCBUN, POCHEMO, POCHCT in the last 72 hours.     Coags: No results found for: PROTIME, INR, APTT    HCG (If Applicable): No results found for: PREGTESTUR, PREGSERUM, HCG, HCGQUANT     ABGs: No results found for: PHART, PO2ART, ZHB6HVW, EPH5NLY, BEART, B5KZXNGA     Type & Screen (If Applicable):  No results found for: LABABO,

## 2023-05-03 NOTE — PROCEDURES
DELIVERY NOTE/    female/ infant   APGARS 8/9  Bulb suction on perineum  Cord clamped and cut after 30 second delay  Handed to waiting nursing staff  Placenta delivered without complication with three vessel cord intact  Cord gases obtained  No complications  Qbl [id number]  Condition stable  Sponge count correct  Mom and baby to post partum    Alex Saucedo MD

## 2023-05-03 NOTE — ANESTHESIA PROCEDURE NOTES
Epidural Block    Patient location during procedure: OB  Reason for block: labor epidural  Staffing  Performed: resident/CRNA   Resident/CRNA: Jason Figueroa APRN - CRNA  Epidural  Patient position: sitting  Prep: ChloraPrep  Patient monitoring: continuous pulse ox and frequent blood pressure checks  Approach: midline  Location: L3-4  Injection technique: LI air  Provider prep: mask and sterile gloves  Needle  Needle type: Tuohy   Needle gauge: 18 G  Needle length: 3.5 in  Needle insertion depth: 6 cm  Catheter type: end hole  Catheter size: 20 G (20g)  Catheter at skin depth: 13 cm  Test dose: negativeCatheter Secured: tegaderm and tape  Assessment  Hemodynamics: stable  Attempts: 1  Outcomes: uncomplicated and patient tolerated procedure well  Preanesthetic Checklist  Completed: patient identified, IV checked, site marked, risks and benefits discussed, surgical/procedural consents, equipment checked, pre-op evaluation, timeout performed, anesthesia consent given, oxygen available and monitors applied/VS acknowledged

## 2023-05-03 NOTE — PROGRESS NOTES
Pt , 38 weeks, came in with complaint of LOF. Pt states positive FM and occasional CTX. Pt denies VB. VSS.

## 2023-05-03 NOTE — PROGRESS NOTES
Patient ambulated to restroom without difficulty attempted to void without success. Laverne PELLETIER notified in nurse to nurse report that patient reports this has been a problem in all pregnancies.

## 2023-05-03 NOTE — H&P
Department of Obstetrics and Gynecology  Labor and Delivery  History & Physical    Patient:  Jaycee Lin     Admit Date:  2023 11:02 PM  Medical Record Number:  [id number]    CHIEF COMPLAINT:  leaking of fluid    PROBLEM LIST:     Patient Active Problem List   Diagnosis    Vitamin D deficiency    Hypothyroid    Hashimoto's disease    Depression    Slow transit constipation    BMI 40.0-44.9, adult ([address]    Fourth pregnancy    Multigravida of advanced maternal age in third trimester    40 weeks gestation of pregnancy           HISTORY OF PRESENT ILLNESS:    The patient is a 41 y.o. female  at 43w4d. Patient presents with a chief complaint as above and is being admitted for leaking clear fliud since , has had 2 contractions since 10pm. +FM    ESTIMATED DUE DATE: Estimated Date of Delivery: 5/15/23    PRENATAL CARE:  Complicated by: AMA  GBS: negative    Past OB History  OB History          4    Para   3    Term   3            AB        Living   3         SAB        IAB        Ectopic        Molar        Multiple        Live Births   3                Past Medical History:        Diagnosis Date    Depression     Hypothyroidism complicating pregnancy     Weight gain        Past Surgical History:        Procedure Laterality Date    WISDOM TOOTH EXTRACTION         Allergies:  Patient has no known allergies.     Social History:    Social History     Socioeconomic History    Marital status: Single     Spouse name: Not on file    Number of children: Not on file    Years of education: Not on file    Highest education level: Not on file   Occupational History    Not on file   Tobacco Use    Smoking status: Former     Types: Cigarettes    Smokeless tobacco: Never   Substance and Sexual Activity    Alcohol use: Not Currently     Comment: one glass wine daily     Drug use: No    Sexual activity: Not Currently   Other Topics Concern    Not on file   Social History Narrative    Not on file

## 2023-05-03 NOTE — PROGRESS NOTES
RN remained at bedside throughout pushing. EFM continuously assessed. Vaginal delivery of viable infant girl at 18 with delayed cord clamping. Apgars 8/9. Mother and baby in stable condition.

## 2023-05-04 LAB
HCT VFR BLD AUTO: 31.7 % (ref 34–48)
HGB BLD-MCNC: 10.1 G/DL (ref 11.5–15.5)

## 2023-05-04 PROCEDURE — 36415 COLL VENOUS BLD VENIPUNCTURE: CPT

## 2023-05-04 PROCEDURE — 85014 HEMATOCRIT: CPT

## 2023-05-04 PROCEDURE — 6370000000 HC RX 637 (ALT 250 FOR IP): Performed by: OBSTETRICS & GYNECOLOGY

## 2023-05-04 PROCEDURE — 85018 HEMOGLOBIN: CPT

## 2023-05-04 PROCEDURE — 1220000000 HC SEMI PRIVATE OB R&B

## 2023-05-04 RX ORDER — IBUPROFEN 600 MG/1
600 TABLET ORAL EVERY 8 HOURS SCHEDULED
Qty: 120 TABLET | Refills: 3 | Status: SHIPPED | OUTPATIENT
Start: 2023-05-04

## 2023-05-04 RX ORDER — HYDROCORTISONE 25 MG/G
CREAM TOPICAL 2 TIMES DAILY
Status: DISCONTINUED | OUTPATIENT
Start: 2023-05-04 | End: 2023-05-05 | Stop reason: HOSPADM

## 2023-05-04 RX ADMIN — HYDROCORTISONE 2.5%: 25 CREAM TOPICAL at 21:46

## 2023-05-04 RX ADMIN — DOCUSATE SODIUM 100 MG: 100 CAPSULE, LIQUID FILLED ORAL at 08:25

## 2023-05-04 RX ADMIN — IBUPROFEN 600 MG: 600 TABLET, FILM COATED ORAL at 02:58

## 2023-05-04 RX ADMIN — ACETAMINOPHEN 1000 MG: 500 TABLET ORAL at 16:35

## 2023-05-04 RX ADMIN — IBUPROFEN 600 MG: 600 TABLET, FILM COATED ORAL at 19:46

## 2023-05-04 RX ADMIN — IBUPROFEN 600 MG: 600 TABLET, FILM COATED ORAL at 11:46

## 2023-05-04 RX ADMIN — DOCUSATE SODIUM 100 MG: 100 CAPSULE, LIQUID FILLED ORAL at 19:46

## 2023-05-04 RX ADMIN — ACETAMINOPHEN 1000 MG: 500 TABLET ORAL at 08:25

## 2023-05-04 ASSESSMENT — PAIN - FUNCTIONAL ASSESSMENT
PAIN_FUNCTIONAL_ASSESSMENT: ACTIVITIES ARE NOT PREVENTED

## 2023-05-04 ASSESSMENT — PAIN DESCRIPTION - LOCATION
LOCATION: ABDOMEN

## 2023-05-04 ASSESSMENT — PAIN DESCRIPTION - ORIENTATION
ORIENTATION: LOWER

## 2023-05-04 ASSESSMENT — PAIN DESCRIPTION - DESCRIPTORS
DESCRIPTORS: CRAMPING

## 2023-05-04 ASSESSMENT — PAIN SCALES - GENERAL
PAINLEVEL_OUTOF10: 4
PAINLEVEL_OUTOF10: 3
PAINLEVEL_OUTOF10: 2

## 2023-05-04 NOTE — ANESTHESIA POSTPROCEDURE EVALUATION
Department of Anesthesiology  Postprocedure Note    Patient: Davin Buchanan  MRN: [id number]  YOB: 1975  Date of evaluation: 5/4/2023      Procedure Summary     Date: 05/03/23 Room / Location:     Anesthesia Start: 4986 Anesthesia Stop: 9098    Procedure: Labor Analgesia Diagnosis:     Scheduled Providers:  Responsible Provider: Rex Fernandez MD    Anesthesia Type: epidural, spinal, general ASA Status: 3          Anesthesia Type: No value filed.     Dana Phase I:      Dana Phase II:        Anesthesia Post Evaluation    Patient location during evaluation: bedside  Patient participation: complete - patient participated  Level of consciousness: awake and alert  Pain score: 2  Airway patency: patent  Nausea & Vomiting: no nausea and no vomiting  Complications: no  Cardiovascular status: blood pressure returned to baseline and hemodynamically stable  Respiratory status: acceptable, spontaneous ventilation and room air  Hydration status: stable

## 2023-05-04 NOTE — PROGRESS NOTES
SUBJECTIVE:   Patient without complaint    OBJECTIVE:   /61   Pulse 75   Temp 98.2 °F (36.8 °C) (Oral)   Resp 18   Ht 4' 11\" (1.499 m)   Wt 215 lb (97.5 kg)   LMP 08/08/2022   SpO2 97%   Breastfeeding Unknown   BMI 43.42 kg/m²      Lab Results   Component Value Date    WBC 8.1 05/03/2023    HGB 10.1 (L) 05/04/2023    HCT 31.7 (L) 05/04/2023    MCV 99.0 05/03/2023     05/03/2023         Lochia normal rubra   Uterus firm, nontender    ASSESSMENT/PLAN:   Postpartum Day #1   Advance care   Home tomorrow   Acute blood loss anemia - FeSO4    Myesha Daigle MD 5/4/2023 8:41 AM

## 2023-05-04 NOTE — PROGRESS NOTES
[employer] [place] [employer] screening results were discussed with parent. Questions answered. Brochure given to parent. Advised to monitor developmental milestones and contact physician for any concerns.    Irvin Schilder

## 2023-05-05 VITALS
DIASTOLIC BLOOD PRESSURE: 84 MMHG | TEMPERATURE: 98 F | OXYGEN SATURATION: 98 % | WEIGHT: 215 LBS | HEIGHT: 59 IN | SYSTOLIC BLOOD PRESSURE: 138 MMHG | HEART RATE: 80 BPM | BODY MASS INDEX: 43.34 KG/M2 | RESPIRATION RATE: 16 BRPM

## 2023-05-05 PROCEDURE — 6370000000 HC RX 637 (ALT 250 FOR IP): Performed by: OBSTETRICS & GYNECOLOGY

## 2023-05-05 RX ADMIN — ACETAMINOPHEN 1000 MG: 500 TABLET ORAL at 00:33

## 2023-05-05 RX ADMIN — DOCUSATE SODIUM 100 MG: 100 CAPSULE, LIQUID FILLED ORAL at 09:23

## 2023-05-05 RX ADMIN — IBUPROFEN 600 MG: 600 TABLET, FILM COATED ORAL at 04:28

## 2023-05-05 RX ADMIN — ACETAMINOPHEN 1000 MG: 500 TABLET ORAL at 09:23

## 2023-05-05 ASSESSMENT — PAIN DESCRIPTION - LOCATION
LOCATION: BUTTOCKS
LOCATION: BUTTOCKS

## 2023-05-05 ASSESSMENT — PAIN - FUNCTIONAL ASSESSMENT
PAIN_FUNCTIONAL_ASSESSMENT: ACTIVITIES ARE NOT PREVENTED
PAIN_FUNCTIONAL_ASSESSMENT: ACTIVITIES ARE NOT PREVENTED

## 2023-05-05 ASSESSMENT — PAIN DESCRIPTION - DESCRIPTORS
DESCRIPTORS: SORE
DESCRIPTORS: SORE

## 2023-05-05 ASSESSMENT — PAIN SCALES - GENERAL
PAINLEVEL_OUTOF10: 2
PAINLEVEL_OUTOF10: 5
PAINLEVEL_OUTOF10: 7

## 2023-05-05 NOTE — FLOWSHEET NOTE
Instructions for discharge care given by night turn RN. Verbal understanding given and questions answered. Denies further needs.

## 2023-05-05 NOTE — FLOWSHEET NOTE
Discharged home. Taken out in wheelchair with belongings, discharge information and prescription information. Infant in stable condition with mother.

## 2023-05-05 NOTE — PLAN OF CARE
Problem: Vaginal Birth or  Section  Goal: Fetal and maternal status remain reassuring during the birth process  Description:  Birth OB-Pregnancy care plan goal which identifies if the fetal and maternal status remain reassuring during the birth process  2023 2334 by Matt Brady RN  Outcome: Progressing     Problem: Pain  Goal: Verbalizes/displays adequate comfort level or baseline comfort level  Outcome: Progressing  Flowsheets (Taken 5/3/2023 1050)  Verbalizes/displays adequate comfort level or baseline comfort level:   Encourage patient to monitor pain and request assistance   Assess pain using appropriate pain scale   Administer analgesics based on type and severity of pain and evaluate response   Implement non-pharmacological measures as appropriate and evaluate response     Problem: Postpartum  Goal: Experiences normal postpartum course  Description:  Postpartum OB-Pregnancy care plan goal which identifies if the mother is experiencing a normal postpartum course  Outcome: Progressing  Goal: Appropriate maternal -  bonding  Description:  Postpartum OB-Pregnancy care plan goal which identifies if the mother and  are bonding appropriately  Outcome: Progressing  Goal: Establishment of infant feeding pattern  Description:  Postpartum OB-Pregnancy care plan goal which identifies if the mother is establishing a feeding pattern with their   Outcome: Progressing     Problem: Infection - Adult  Goal: Absence of infection during hospitalization  Outcome: Progressing     Problem: Safety - Adult  Goal: Free from fall injury  Outcome: Progressing
Problem: Vaginal Birth or  Section  Goal: Fetal and maternal status remain reassuring during the birth process  Description:  Birth OB-Pregnancy care plan goal which identifies if the fetal and maternal status remain reassuring during the birth process  Outcome: Progressing
fever/infection during anticipated neutropenic period  Outcome: Progressing     Problem: Safety - Adult  Goal: Free from fall injury  Outcome: Progressing     Problem: Discharge Planning  Goal: Discharge to home or other facility with appropriate resources  Outcome: Progressing     Problem: Chronic Conditions and Co-morbidities  Goal: Patient's chronic conditions and co-morbidity symptoms are monitored and maintained or improved  Outcome: Progressing

## 2023-05-05 NOTE — DISCHARGE INSTRUCTIONS
area in your calf. Never Shake a Baby Promise    Shaking can kill a baby. It can also cause seizures, brain damage, learning problems, cerebral palsy, blindness and other serious health and developmental problems. I understand that shaking a baby is a serious form of child abuse. I Promise Never To Shake My Baby    I understand that caregivers other then the mother often shake babies. I also promise to discuss the dangers of shaking a baby with everyone who takes care of my baby. I promise to tell anyone who cares for my baby to never, never shake my baby.

## 2023-05-05 NOTE — PROGRESS NOTES
SUBJECTIVE:   Patient without complaint    OBJECTIVE:   BP (!) 117/58   Pulse 72   Temp 98.1 °F (36.7 °C) (Oral)   Resp 16   Ht 4' 11\" (1.499 m)   Wt 215 lb (97.5 kg)   LMP 08/08/2022   SpO2 95%   Breastfeeding Unknown   BMI 43.42 kg/m²    Lochia normal rubra   Uterus firm, nontender    ASSESSMENT/PLAN:   Postpartum Day #2   Discharge home with precaution   Follow-up 6 weeks    Kulwinder Vicente MD 5/5/2023 7:49 AM

## 2023-08-21 NOTE — PROGRESS NOTES
Pt had MRSA outbreak on left leg and was taking antibiotics and has now completed them  Pt states good fetal movement  Pt denies any bleeding/cramping Quality 137: Melanoma: Continuity Of Care - Recall System: Patient information entered into a recall system that includes: target date for the next exam specified AND a process to follow up with patients regarding missed or unscheduled appointments Quality 111:Pneumonia Vaccination Status For Older Adults: Patient received any pneumococcal conjugate or polysaccharide vaccine on or after their 60th birthday and before the end of the measurement period Quality 226: Preventive Care And Screening: Tobacco Use: Screening And Cessation Intervention: Patient screened for tobacco use and is an ex/non-smoker Detail Level: Detailed Quality 130: Documentation Of Current Medications In The Medical Record: Current Medications Documented Quality 110: Preventive Care And Screening: Influenza Immunization: Influenza Immunization Administered during Influenza season

## 2024-02-08 ENCOUNTER — HOSPITAL ENCOUNTER (OUTPATIENT)
Age: 49
Discharge: HOME | End: 2024-02-10

## 2024-02-13 LAB — SURGICAL PATHOLOGY REPORT: NORMAL
